# Patient Record
Sex: FEMALE | Race: WHITE | NOT HISPANIC OR LATINO | Employment: UNEMPLOYED | URBAN - METROPOLITAN AREA
[De-identification: names, ages, dates, MRNs, and addresses within clinical notes are randomized per-mention and may not be internally consistent; named-entity substitution may affect disease eponyms.]

---

## 2017-01-12 ENCOUNTER — GENERIC CONVERSION - ENCOUNTER (OUTPATIENT)
Dept: OTHER | Facility: OTHER | Age: 57
End: 2017-01-12

## 2017-02-21 ENCOUNTER — ALLSCRIPTS OFFICE VISIT (OUTPATIENT)
Dept: OTHER | Facility: OTHER | Age: 57
End: 2017-02-21

## 2017-02-21 LAB
BILIRUB UR QL STRIP: NEGATIVE
CLARITY UR: NORMAL
COLOR UR: YELLOW
GLUCOSE (HISTORICAL): NEGATIVE
HGB UR QL STRIP.AUTO: NEGATIVE
KETONES UR STRIP-MCNC: NEGATIVE MG/DL
LEUKOCYTE ESTERASE UR QL STRIP: NORMAL
NITRITE UR QL STRIP: NEGATIVE
PH UR STRIP.AUTO: 7 [PH]
PROT UR STRIP-MCNC: NORMAL MG/DL
SP GR UR STRIP.AUTO: 1
UROBILINOGEN UR QL STRIP.AUTO: NEGATIVE

## 2017-02-22 DIAGNOSIS — Z13.220 ENCOUNTER FOR SCREENING FOR LIPOID DISORDERS: ICD-10-CM

## 2017-02-22 DIAGNOSIS — Z12.31 ENCOUNTER FOR SCREENING MAMMOGRAM FOR MALIGNANT NEOPLASM OF BREAST: ICD-10-CM

## 2017-02-22 DIAGNOSIS — E55.9 VITAMIN D DEFICIENCY: ICD-10-CM

## 2017-02-22 DIAGNOSIS — Z23 ENCOUNTER FOR IMMUNIZATION: ICD-10-CM

## 2017-02-22 DIAGNOSIS — Z00.00 ENCOUNTER FOR GENERAL ADULT MEDICAL EXAMINATION WITHOUT ABNORMAL FINDINGS: ICD-10-CM

## 2017-02-22 DIAGNOSIS — R53.82 CHRONIC FATIGUE: ICD-10-CM

## 2017-03-07 ENCOUNTER — HOSPITAL ENCOUNTER (OUTPATIENT)
Dept: RADIOLOGY | Age: 57
Discharge: HOME | End: 2017-03-07
Payer: COMMERCIAL

## 2017-03-07 DIAGNOSIS — Z12.31 ENCOUNTER FOR SCREENING MAMMOGRAM FOR MALIGNANT NEOPLASM OF BREAST: ICD-10-CM

## 2017-03-07 PROCEDURE — G0202 SCR MAMMO BI INCL CAD: HCPCS

## 2017-03-21 ENCOUNTER — ALLSCRIPTS OFFICE VISIT (OUTPATIENT)
Dept: OTHER | Facility: OTHER | Age: 57
End: 2017-03-21

## 2017-03-30 ENCOUNTER — LAB CONVERSION - ENCOUNTER (OUTPATIENT)
Dept: OTHER | Facility: OTHER | Age: 57
End: 2017-03-30

## 2017-03-30 LAB
25(OH)D3 SERPL-MCNC: 41 NG/ML (ref 30–100)
A/G RATIO (HISTORICAL): 2.4 (CALC) (ref 1–2.5)
ALBUMIN SERPL BCP-MCNC: 4.5 G/DL (ref 3.6–5.1)
ALP SERPL-CCNC: 58 U/L (ref 33–130)
ALT SERPL W P-5'-P-CCNC: 23 U/L (ref 6–29)
AST SERPL W P-5'-P-CCNC: 22 U/L (ref 10–35)
BASOPHILS # BLD AUTO: 1.3 %
BASOPHILS # BLD AUTO: 56 CELLS/UL (ref 0–200)
BILIRUB SERPL-MCNC: 0.4 MG/DL (ref 0.2–1.2)
BUN SERPL-MCNC: 14 MG/DL (ref 7–25)
BUN/CREA RATIO (HISTORICAL): NORMAL (CALC) (ref 6–22)
CALCIUM SERPL-MCNC: 9.6 MG/DL (ref 8.6–10.4)
CHLORIDE SERPL-SCNC: 101 MMOL/L (ref 98–110)
CHOLEST SERPL-MCNC: 213 MG/DL (ref 125–200)
CHOLEST/HDLC SERPL: 2.9 (CALC)
CO2 SERPL-SCNC: 26 MMOL/L (ref 20–31)
CREAT SERPL-MCNC: 1.02 MG/DL (ref 0.5–1.05)
DEPRECATED RDW RBC AUTO: 14.6 % (ref 11–15)
EGFR AFRICAN AMERICAN (HISTORICAL): 71 ML/MIN/1.73M2
EGFR-AMERICAN CALC (HISTORICAL): 61 ML/MIN/1.73M2
EOSINOPHIL # BLD AUTO: 215 CELLS/UL (ref 15–500)
EOSINOPHIL # BLD AUTO: 5 %
ESTRADIOL LEVEL (HISTORICAL): 16 PG/ML
GAMMA GLOBULIN (HISTORICAL): 1.9 G/DL (CALC) (ref 1.9–3.7)
GLUCOSE (HISTORICAL): 95 MG/DL (ref 65–99)
HCT VFR BLD AUTO: 44.3 % (ref 35–45)
HDLC SERPL-MCNC: 73 MG/DL
HGB BLD-MCNC: 14.4 G/DL (ref 11.7–15.5)
LDL CHOLESTEROL (HISTORICAL): 126 MG/DL (CALC)
LYMPHOCYTES # BLD AUTO: 1458 CELLS/UL (ref 850–3900)
LYMPHOCYTES # BLD AUTO: 33.9 %
MCH RBC QN AUTO: 28.5 PG (ref 27–33)
MCHC RBC AUTO-ENTMCNC: 32.7 G/DL (ref 32–36)
MCV RBC AUTO: 87.4 FL (ref 80–100)
MONOCYTES # BLD AUTO: 305 CELLS/UL (ref 200–950)
MONOCYTES (HISTORICAL): 7.1 %
NEUTROPHILS # BLD AUTO: 2266 CELLS/UL (ref 1500–7800)
NEUTROPHILS # BLD AUTO: 52.7 %
NON-HDL-CHOL (CHOL-HDL) (HISTORICAL): 140 MG/DL (CALC)
PLATELET # BLD AUTO: 238 THOUSAND/UL (ref 140–400)
PMV BLD AUTO: 9.5 FL (ref 7.5–12.5)
POTASSIUM SERPL-SCNC: 4 MMOL/L (ref 3.5–5.3)
PROGESTERONE LEVEL (HISTORICAL): <0.5 NG/ML
RBC # BLD AUTO: 5.07 MILLION/UL (ref 3.8–5.1)
SODIUM SERPL-SCNC: 137 MMOL/L (ref 135–146)
T3FREE SERPL-MCNC: 2.9 PG/ML (ref 2.3–4.2)
T4 FREE SERPL-MCNC: 1.3 NG/DL (ref 0.8–1.8)
TOTAL PROTEIN (HISTORICAL): 6.4 G/DL (ref 6.1–8.1)
TRIGL SERPL-MCNC: 72 MG/DL
WBC # BLD AUTO: 4.3 THOUSAND/UL (ref 3.8–10.8)

## 2017-04-11 ENCOUNTER — GENERIC CONVERSION - ENCOUNTER (OUTPATIENT)
Dept: OTHER | Facility: OTHER | Age: 57
End: 2017-04-11

## 2017-11-01 DIAGNOSIS — Z11.59 ENCOUNTER FOR SCREENING FOR OTHER VIRAL DISEASES (CODE): ICD-10-CM

## 2017-11-08 ENCOUNTER — GENERIC CONVERSION - ENCOUNTER (OUTPATIENT)
Dept: OTHER | Facility: OTHER | Age: 57
End: 2017-11-08

## 2017-11-08 ENCOUNTER — LAB CONVERSION - ENCOUNTER (OUTPATIENT)
Dept: OTHER | Facility: OTHER | Age: 57
End: 2017-11-08

## 2017-11-08 LAB
HEPATITIS C ANTIBODY (HISTORICAL): NORMAL
SIGNAL TO CUT-OFF (HISTORICAL): 0

## 2018-01-10 NOTE — RESULT NOTES
Verified Results  (Q) HEPATITIS C ANTIBODY 62PSP8088 08:26AM Eleanor Villalba   REPORT COMMENT:  FASTING:NO     Test Name Result Flag Reference   HEPATITIS C ANTIBODY NON-REACTIVE  NON-REACTIVE   SIGNAL TO CUT-OFF 0 00  <1 00

## 2018-01-12 VITALS
DIASTOLIC BLOOD PRESSURE: 82 MMHG | SYSTOLIC BLOOD PRESSURE: 122 MMHG | OXYGEN SATURATION: 99 % | RESPIRATION RATE: 16 BRPM | WEIGHT: 170.2 LBS | BODY MASS INDEX: 29.06 KG/M2 | TEMPERATURE: 98.1 F | HEIGHT: 64 IN | HEART RATE: 50 BPM

## 2018-01-12 NOTE — MISCELLANEOUS
Message   Recorded as Task   Date: 01/12/2017 05:11 PM, Created By: Torie Lopez   Task Name: Follow Up   Assigned To: Betty Carroll   Regarding Patient: Snehal Veronica, Status: In Progress   Comment:    Torie Lopez - 12 Jan 2017 5:11 PM     TASK CREATED  Pt lm - yeast in - used monistat 1 - still has sx  [address] Jan 2017 5:12 PM     TASK IN PROGRESS   Torie Lopez - 12 Jan 2017 5:15 PM     TASK EDITED  Rx diflucan, 150 mg, 2 tabs, sig 1 po stat and 1 in 3 days to r/a [id number]        Active Problems    1  Encounter for screening for lipid disorder (V77 91) (Z13 220)   2  Hip pain, right (719 45) (M25 551)   3  Influenza vaccine needed (V04 81) (Z23)   4  Pain in both wrists (719 43) (M25 531,M25 532)   5  Vitamin D deficiency (268 9) (E55 9)    Current Meds   1  Calcium + D 250-125 MG-UNIT TABS; Therapy: 32HET2695 to (Last Rx:11May2011)  Requested for: 86NUE3137 Ordered   2  Cyclobenzaprine HCl - 10 MG Oral Tablet; 1/2-1 tab TID prn muscle spasm; Therapy: 53ZEW5523 to (Last Rx:69Teu9971)  Requested for: 91HDT6449 Ordered   3  Doxycycline Hyclate 20 MG Oral Tablet Recorded   4  Fish Oil OIL; Therapy: 71WEC7816 to (Last Rx:11May2011)  Requested for: 67GOO0127 Ordered   5  Meloxicam 7 5 MG Oral Tablet; TAKE 1 TABLET TWICE DAILY WITH FOOD; Therapy: 83JDC3204 to (Renew:54Yft3969)  Requested for: 23Jun2016; Last   KB:70TLE2109 Ordered   6  Multivitamins Oral Capsule; Therapy: 40MUA9342 to (Last Rx:63Spc5209)  Requested for: 39MEY4103 Ordered   7  PreviDent 5000 Sensitive 1 1-5 % Dental Paste; Therapy: 66EQW0515 to (Last Rx:17Mar2011)  Requested for: 57QGL1958 Ordered   8  Selenium TABS Recorded    Allergies    1  No Known Drug Allergies    Signatures   Electronically signed by :  Savanna Cisse, ; Jan 12 2017  5:16PM EST                       (Author)

## 2018-01-12 NOTE — RESULT NOTES
Verified Results  (1) CBC/PLT/DIFF 62NAY4587 06:58AM Natalie Velasco     Test Name Result Flag Reference   WHITE BLOOD CELL COUNT 4 3 Thousand/uL  3 8-10 8   RED BLOOD CELL COUNT 5 07 Million/uL  3 80-5 10   HEMOGLOBIN 14 4 g/dL  11 7-15 5   HEMATOCRIT 44 3 %  35 0-45 0   MCV 87 4 fL  80 0-100 0   MCH 28 5 pg  27 0-33 0   MCHC 32 7 g/dL  32 0-36 0   RDW 14 6 %  11 0-15 0   PLATELET COUNT 562 Thousand/uL  140-400   MPV 9 5 fL  7 5-12 5   ABSOLUTE NEUTROPHILS 2266 cells/uL  7633-8244   ABSOLUTE LYMPHOCYTES 1458 cells/uL  850-3900   ABSOLUTE MONOCYTES 305 cells/uL  200-950   ABSOLUTE EOSINOPHILS 215 cells/uL     ABSOLUTE BASOPHILS 56 cells/uL  0-200   NEUTROPHILS 52 7 %     LYMPHOCYTES 33 9 %     MONOCYTES 7 1 %     EOSINOPHILS 5 0 %     BASOPHILS 1 3 %       (1) COMPREHENSIVE METABOLIC PANEL 03WCU7855 10:59MM Natalie Velasco     Test Name Result Flag Reference   GLUCOSE 95 mg/dL  65-99   Fasting reference interval   UREA NITROGEN (BUN) 14 mg/dL  7-25   CREATININE 1 02 mg/dL  0 50-1 05   For patients >52years of age, the reference limit  for Creatinine is approximately 13% higher for people  identified as -American  eGFR NON-AFR   AMERICAN 61 mL/min/1 73m2  > OR = 60   eGFR AFRICAN AMERICAN 71 mL/min/1 73m2  > OR = 60   BUN/CREATININE RATIO   6-48   NOT APPLICABLE (calc)   SODIUM 137 mmol/L  135-146   POTASSIUM 4 0 mmol/L  3 5-5 3   CHLORIDE 101 mmol/L     CARBON DIOXIDE 26 mmol/L  20-31   CALCIUM 9 6 mg/dL  8 6-10 4   PROTEIN, TOTAL 6 4 g/dL  6 1-8 1   ALBUMIN 4 5 g/dL  3 6-5 1   GLOBULIN 1 9 g/dL (calc)  1 9-3 7   ALBUMIN/GLOBULIN RATIO 2 4 (calc)  1 0-2 5   BILIRUBIN, TOTAL 0 4 mg/dL  0 2-1 2   ALKALINE PHOSPHATASE 58 U/L     AST 22 U/L  10-35   ALT 23 U/L  6-29     (1) LIPID PANEL, FASTING 20NDF9394 06:58AM Natalie Velasco     Test Name Result Flag Reference   CHOLESTEROL, TOTAL 213 mg/dL H 125-200   HDL CHOLESTEROL 73 mg/dL  > OR = 46   TRIGLICERIDES 72 mg/dL  <206   LDL-CHOLESTEROL 126 mg/dL (calc)  <130   Desirable range <100 mg/dL for patients with CHD or  diabetes and <70 mg/dL for diabetic patients with  known heart disease  CHOL/HDLC RATIO 2 9 (calc)  < OR = 5 0   NON HDL CHOLESTEROL 140 mg/dL (calc)     Target for non-HDL cholesterol is 30 mg/dL higher than   LDL cholesterol target  (1) T4, FREE [id number] 06:58AM Krista, Vergil Amen     Test Name Result Flag Reference   T4, FREE 1 3 ng/dL  0 8-1 8     (1) FREE T3 40QFQ6517 06:58AM Krista, Vergil Amen   REPORT COMMENT:  FASTING:YES     Test Name Result Flag Reference   T3, FREE 2 9 pg/mL  2 3-4 2     (1) PROGESTERONE [id number] 06:58AM Krista, Vergil Amen     Test Name Result Flag Reference   PROGESTERONE <0 5 ng/mL     Reference Ranges           Female              Follicular Phase     < 1 0              Luteal Phase      2 6-21 5              Post menopausal      < 0 5              Pregnancy              1st Trimester     4 1-34 0              2nd Trimester    24 0-76 0              3rd Trimester   52 0-302 0     (Q) ESTRADIOL 37JYO7341 06:58AM Krista, Vergil Amen     Test Name Result Flag Reference   ESTRADIOL 16 pg/mL     Reference Range          Follicular Phase:              Mid-Cycle:                     Luteal Phase:                  Postmenopausal:      < or = 31            Reference range established on post-pubertal patient  population  No pre-pubertal reference range  established using this assay  For any patients for  whom low Estradiol levels are anticipated (e g  males,  pre-pubertal children and hypogonadal/post-menopausal   females), the [employer]  Estradiol, Ultrasensitive, LCMSMS assay is recommended  (order code 45050)  Please note: patients being treated with the drug   fulvestrant (Faslodex(R)) have demonstrated significant   interference in immunoassay methods for estradiol   measurement   The cross reactivity could lead to falsely   elevated estradiol test results leading to an   inappropriate clinical assessment of estrogen status  [employer] order code 30289-Estradiol,   Ultrasensitive LC/MS/MS demonstrates negligible cross   reactivity with fulvestrant  *(Q) VITAMIN D, 25-HYDROXY, LC/MS/MS 81JVK7811 06:58AM Liz Velasco     Test Name Result Flag Reference   VITAMIN D, 25-OH, TOTAL 41 ng/mL     Vitamin D Status         25-OH Vitamin D:     Deficiency:                    <20 ng/mL  Insufficiency:             20 - 29 ng/mL  Optimal:                 > or = 30 ng/mL     For 25-OH Vitamin D testing on patients on   D2-supplementation and patients for whom quantitation   of D2 and D3 fractions is required, the QuestAssureD(TM)  25-OH VIT D, (D2,D3), LC/MS/MS is recommended: order   code 56193 (patients >2yrs)  For more information on this test, go to:  http://education  [employer]/faq/DIO281  (This link is being provided for   informational/educational purposes only )

## 2018-01-13 NOTE — PROGRESS NOTES
Assessment    1  Encounter for preventive health examination (V70 0) (Z00 00)    Plan  Chronic fatigue    · (1) FREE T3; Status:Active; Requested for:77Tjw2081;    · (1) PROGESTERONE; Status:Active; Requested for:56Pbt1258;   Chronic fatigue, Health Maintenance, Encounter for screening for lipid disorder, Influenza  vaccine needed, Vitamin D deficiency    · (1) T4, FREE; Status:Active; Requested for:92Jiz5632;   Chronic fatigue, Vitamin D deficiency    · (1) VITAMIN D 25-HYDROXY; Status:Active; Requested for:18Zjr4161; Health Maintenance, Encounter for screening for lipid disorder, Influenza vaccine  needed, Vitamin D deficiency    · (1) CBC/PLT/DIFF; Status:Active; Requested for:23Umy4846;    · (1) COMPREHENSIVE METABOLIC PANEL; Status:Active; Requested for:92Ipk4159;    · (1) ESTRADIOL; Status:Active; Requested for:61Gdx1134;    · (1) LIPID PANEL, FASTING; Status:Active; Requested for:46Vxt5219;     Discussion/Summary  healthy adult female Currently, she eats a healthy diet  cervical cancer screening is current Breast cancer screening: the risks and benefits of breast cancer screening were discussed and mammogram is needed every year  Colorectal cancer screening: the risks and benefits of colorectal cancer screening were discussed  Osteoporosis screening: the risks and benefits of osteoporosis screening were discussed  The risks and benefits of immunizations were discussed  Advice and education were given regarding weight bearing exercise  Patient discussion: discussed with the patient  Patient in good health  Discussed need for labs and ordered  Discussed estrogen and it's relationship to good health and need for normalizing  Exam normal       Chief Complaint  physical      History of Present Illness  HM, Adult Female: The patient is being seen for a health maintenance evaluation  The last health maintenance visit was seven yrs ago year(s) ago  General Health:  The patient's health since the last visit is described as good  She has regular dental visits  She complains of vision problems  Reproductive health: the patient is postmenopausal    Screening: cancer screening reviewed and current  metabolic screening reviewed and current  risk screening reviewed and current  HPI: Patient for adult female physical exam  offers no new complaints      Review of Systems    Constitutional: No fever, no chills, feels well, no tiredness, no recent weight gain or weight loss  Eyes: No complaints of eye pain, no red eyes, no eyesight problems, no discharge, no dry eyes, no itching of eyes  ENT: no complaints of earache, no loss of hearing, no nose bleeds, no nasal discharge, no sore throat, no hoarseness  Cardiovascular: No complaints of slow heart rate, no fast heart rate, no chest pain, no palpitations, no leg claudication, no lower extremity edema  Respiratory: No complaints of shortness of breath, no wheezing, no cough, no SOB on exertion, no orthopnea, no PND  Gastrointestinal: No complaints of abdominal pain, no constipation, no nausea or vomiting, no diarrhea, no bloody stools  Genitourinary: No complaints of dysuria, no incontinence, no pelvic pain, no dysmenorrhea, no vaginal discharge or bleeding  Musculoskeletal: No complaints of arthralgias, no myalgias, no joint swelling or stiffness, no limb pain or swelling  Integumentary: No complaints of skin rash or lesions, no itching, no skin wounds, no breast pain or lump  Neurological: No complaints of headache, no confusion, no convulsions, no numbness, no dizziness or fainting, no tingling, no limb weakness, no difficulty walking  Psychiatric: Not suicidal, no sleep disturbance, no anxiety or depression, no change in personality, no emotional problems  Endocrine: No complaints of proptosis, no hot flashes, no muscle weakness, no deepening of the voice, no feelings of weakness     Hematologic/Lymphatic: No complaints of swollen glands, no swollen glands in the neck, does not bleed easily, does not bruise easily  Active Problems    1  Encounter for screening for lipid disorder (V77 91) (Z13 220)   2  Influenza vaccine needed (V04 81) (Z23)   3   Vitamin D deficiency (268 9) (E55 9)    Past Medical History    · History of Costochondritis (733 6) (M94 0)   · History of Forceps delivery (669 50) (O66 5)   · History of Gum And Periodontal Disease (523 9)   · History of acute bronchitis (V12 69) (Z87 09)   · History of alopecia (V13 89) (X71 326)   · History of hypoglycemia (V12 29) (Z86 39)   · History of lymphadenopathy (V13 89) (M80 956)   · History of morphea (V13 3) (Z87 2)   · History of shortness of breath (V13 89) (M31 447)   · History of shortness of breath (V13 89) (C47 956)   · History of thyroid disease (V12 29) (Z86 39)   · History of Menopause Occurred At Age 39   · History of Primary female infertility (628 9) (N97 9)   · History of Vacuum extractor delivery, delivered (062 26) (O66 5)    Surgical History    · History of Complete Colonoscopy    Family History  Mother    · Family history of Asthma (V17 5)  Father    · Family history of Heart Disease (V17 49)   · Family history of Hypertension (V17 49)   · Family history of Lymphoma (V16 7)   · Family history of Prolapsing Mitral Valve Leaflet Syndrome   · Family history of Systemic Lupus Erythematosus  Son    · Family history of Diabetes Mellitus (V18 0)  Sister    · Family history of Prolapsing Mitral Valve Leaflet Syndrome  Paternal Grandmother    · Family history of Stroke Syndrome (V17 1)  Maternal Grandfather    · Family history of Breast Cancer (V16 3)   · Family history of Prostate Cancer (V16 42)  Maternal Uncle    · Family history of Glaucoma    Social History    · Being A Social Drinker   · Daily Coffee Consumption (2  Cups/Day)   · Denied: History of Drug Use   · Exercising Regularly   · Exercising regularly (more than 90 min/week)   · Marital History - Currently    · Never A Smoker   · Denied: History of Tobacco Use   · Uses Safety Equipment - Protective Head Gear   · Uses Safety Equipment - Seatbelts    Current Meds   1  Calcium + D 250-125 MG-UNIT TABS; Therapy: 71HME5416 to (Last Rx:11May2011)  Requested for: 57TJQ9697 Ordered   2  Cyclobenzaprine HCl - 10 MG Oral Tablet; 1/2-1 tab TID prn muscle spasm; Therapy: 03OJY3922 to (Last Rx:19May2016)  Requested for: 03IEC9551 Ordered   3  Doxycycline Hyclate 20 MG Oral Tablet Recorded   4  Fish Oil OIL; Therapy: 55UTP0895 to (Last Rx:11May2011)  Requested for: 88THY3656 Ordered   5  Meloxicam 7 5 MG Oral Tablet; TAKE 1 TABLET TWICE DAILY WITH FOOD; Therapy: 52WGB4748 to (Renew:49Ihw0845)  Requested for: 23Jun2016; Last   FE:56QUG8445 Ordered   6  Multivitamins Oral Capsule; Therapy: 16BHS5311 to (Last Rx:11May2011)  Requested for: 91MKB4743 Ordered   7  PreviDent 5000 Sensitive 1 1-5 % Dental Paste; Therapy: 31GJD1315 to (Last Rx:17Mar2011)  Requested for: 41SCB1119 Ordered   8  Selenium TABS Recorded    Allergies    1  No Known Drug Allergies    Vitals   Recorded: 74Wjw8035 09:11AM   Temperature 98 1 F, Tympanic   Heart Rate 50, L Brachial Artery   Pulse Quality Normal, L Brachial Artery   Respiration Quality Normal   Respiration 16   Systolic 955, LUE, Sitting   Diastolic 82, LUE, Sitting   Height 5 ft 3 5 in   Weight 170 lb 3 2 oz   BMI Calculated 29 68   BSA Calculated 1 82   O2 Saturation 99     Physical Exam    Constitutional   General appearance: No acute distress, well appearing and well nourished  Eyes   Conjunctiva and lids: No swelling, erythema or discharge  Pupils and irises: Equal, round and reactive to light  Ears, Nose, Mouth, and Throat   External inspection of ears and nose: Normal     Otoscopic examination: Tympanic membranes translucent with normal light reflex  Canals patent without erythema  Oropharynx: Normal with no erythema, edema, exudate or lesions      Pulmonary   Respiratory effort: No increased work of breathing or signs of respiratory distress  Auscultation of lungs: Clear to auscultation  Cardiovascular   Palpation of heart: Normal PMI, no thrills  Auscultation of heart: Normal rate and rhythm, normal S1 and S2, without murmurs  Examination of extremities for edema and/or varicosities: Normal     Abdomen   Abdomen: Non-tender, no masses  Liver and spleen: No hepatomegaly or splenomegaly  Lymphatic   Palpation of lymph nodes in neck: No lymphadenopathy  Musculoskeletal   Gait and station: Normal     Digits and nails: Normal without clubbing or cyanosis  Inspection/palpation of joints, bones, and muscles: Normal     Skin   Skin and subcutaneous tissue: Normal without rashes or lesions  Neurologic   Cranial nerves: Cranial nerves 2-12 intact  Reflexes: 2+ and symmetric  Sensation: No sensory loss  Psychiatric   Orientation to person, place, and time: Normal     Mood and affect: Normal        Results/Data  Urine Dip Non-Automated- POC [id number] 09:15AM Sterling Velasco     Test Name Result Flag Reference   Color Yellow     Clarity Transparent     Leukocytes trace     Nitrite negative     Blood negative     Bilirubin negative     Urobilinogen negative     Protein trace     Ph 7     Specific Gravity 1 000     Ketone negative     Glucose negative       PHQ-2 Adult Depression Screening 34Ydm0426 09:11AM User, [employer]     Test Name Result Flag Reference   PHQ-2 Adult Depression Score 0     Over the last two weeks, how often have you been bothered by any of the following problems? Little interest or pleasure in doing things: Not at all - 0  Feeling down, depressed, or hopeless: Not at all - 0   PHQ-2 Adult Depression Screening Negative         Procedure    Procedure: Visual Acuity Test    Indication: routine screening     Results: 20/20 in both eyes with corrective device, 20/20 in the right eye with corrective device, 20/20 in the left eye with corrective device normal in both eyes  Color vision was and the results were normal    The patient tolerated the procedure well  There were no complications  Procedure: Hearing Acuity Test    Indication: Routine screeing  Audiometry:   Hearing in the right ear: 20 decibals at 500 hertz, 40 decibals at 1000 hertz, 25 decibals at 2000 hertz and 40 decibals at 4000 hertz  Hearing in the left ear: 20 decibals at 500 hertz, 40 decibals at 1000 hertz, 40 decibals at 2000 hertz and 20 decibals at 4000 hertz  The patient Tolerated the procedure well  Future Appointments    Date/Time Provider Specialty Site   03/14/2017 11:00 AM MARIA E Ballard  Obstetrics/Gynecology [facility]     Signatures   Electronically signed by :  MARIA E Springer ; Feb 21 2017 10:03AM EST                       (Author)

## 2018-01-13 NOTE — RESULT NOTES
Verified Results  (1) CBC/PLT/DIFF [id number] 08:33AM Linford Wilmot     Test Name Result Flag Reference   WHITE BLOOD CELL COUNT 4 9 Thousand/uL  3 8-10 8   RED BLOOD CELL COUNT 4 88 Million/uL  3 80-5 10   HEMOGLOBIN 13 8 g/dL  11 7-15 5   HEMATOCRIT 43 0 %  35 0-45 0   MCV 88 1 fL  80 0-100 0   MCH 28 3 pg  27 0-33 0   MCHC 32 1 g/dL  32 0-36 0   RDW 14 3 %  11 0-15 0   PLATELET COUNT 258 Thousand/uL  140-400   MPV 9 3 fL  7 5-11 5   ABSOLUTE NEUTROPHILS 2749 cells/uL  5095-3184   ABSOLUTE LYMPHOCYTES 1612 cells/uL  850-3900   ABSOLUTE MONOCYTES 338 cells/uL  200-950   ABSOLUTE EOSINOPHILS 167 cells/uL     ABSOLUTE BASOPHILS 34 cells/uL  0-200   NEUTROPHILS 56 1 %     LYMPHOCYTES 32 9 %     MONOCYTES 6 9 %     EOSINOPHILS 3 4 %     BASOPHILS 0 7 %       (1) COMPREHENSIVE METABOLIC PANEL 54YOH9305 94:04QO Didimamoff, Omega Oms     Test Name Result Flag Reference   GLUCOSE 91 mg/dL  65-99   Fasting reference interval   UREA NITROGEN (BUN) 18 mg/dL  7-25   CREATININE 0 96 mg/dL  0 50-1 05   For patients >52years of age, the reference limit  for Creatinine is approximately 13% higher for people  identified as -American  eGFR NON-AFR   AMERICAN 67 mL/min/1 73m2  > OR = 60   eGFR AFRICAN AMERICAN 77 mL/min/1 73m2  > OR = 60   BUN/CREATININE RATIO   8-16   NOT APPLICABLE (calc)   SODIUM 139 mmol/L  135-146   POTASSIUM 4 0 mmol/L  3 5-5 3   CHLORIDE 104 mmol/L     CARBON DIOXIDE 24 mmol/L  19-30   CALCIUM 9 6 mg/dL  8 6-10 4   PROTEIN, TOTAL 6 2 g/dL  6 1-8 1   ALBUMIN 4 4 g/dL  3 6-5 1   GLOBULIN 1 8 g/dL (calc) L 1 9-3 7   ALBUMIN/GLOBULIN RATIO 2 4 (calc)  1 0-2 5   BILIRUBIN, TOTAL 0 8 mg/dL  0 2-1 2   ALKALINE PHOSPHATASE 51 U/L     AST 22 U/L  10-35   ALT 21 U/L  6-29     (Q) LIPID PANEL WITH REFLEX TO DIRECT LDL [id number] 08:33AM Didimamoff, Warden Oms     Test Name Result Flag Reference   CHOLESTEROL, TOTAL 220 mg/dL H 125-200   HDL CHOLESTEROL 89 mg/dL  > OR = 46   TRIGLICERIDES 56 mg/dL <150   LDL-CHOLESTEROL 120 mg/dL (calc)  <130   Desirable range <100 mg/dL for patients with CHD or  diabetes and <70 mg/dL for diabetic patients with  known heart disease  CHOL/HDLC RATIO 2 5 (calc)  < OR = 5 0   NON HDL CHOLESTEROL 131 mg/dL (calc)     Target for non-HDL cholesterol is 30 mg/dL higher than   LDL cholesterol target  (Q) SED RATE BY MODIFIED Siddharth Hamilton [id number] 08:33AM Didimaroberto Yesenia Gastonpriscila     Test Name Result Flag Reference   SED RATE BY MODIFIED$[place] 2 mm/h  < OR = 30     (Q) LYME DISEASE AB, TOTAL W/REFL WB (IGG, IGM) [id number] 08:33AM Didnabilroberto Yesenia Jacqui     Test Name Result Flag Reference   LYME AB SCREEN < OR = 0 90 index     Index                 Interpretation                     -----                 --------------                     < or = 0 90           Negative                     0  91-1 09             Equivocal                     > or = 1 10           Positive     The use of purified VlsE-1 and PepC10 antigens in this  assay provides improved specificity compared to assays  that utilize whole cell lysates of B  burgdorferi, the  causative agent of Lyme disease, and slightly better  sensitivity compared to the C6 antibody assay  As recommended by the Food and Drug   Administration (FDA), all samples with positive or   equivocal results in a Borrelia burgdorferi antibody    EIA (screening) will be tested using a blot method  Positive or equivocal screening test results should not   be interpreted as truly positive until verified as such   using a supplemental assay (e g , B  burgdorferi blot)  The screening test and/or blot for B  burgdorferi   antibodies may be falsely negative in early stages of   Lyme disease, including the period when erythema   migrans is apparent       (Q) ANTWAN IFA SCREEN W/REFL TO TITER AND PATTERN, IFA [id number] 08:33AM Aurelia Pop     Test Name Result Flag Reference   ANTWAN SCREEN, IFA NEGATIVE  NEGATIVE     (1) RF QUANTITATION [id number] 08: 33AM [facility]     Test Name Result Flag Reference   RHEUMATOID FACTOR 10 IU/mL  <14     (Q) TSH, 3RD GENERATION [id number] 08:33AM [facility]     Test Name Result Flag Reference   TSH 3 34 mIU/L     Reference Range                         > or = 20 Years  0 40-4 50                              Pregnancy Ranges            First trimester    0 26-2 66            Second trimester   0 55-2 73            Third trimester    0 43-2 91     *(Q) VITAMIN D, 25-HYDROXY, LC/MS/MS [id number] 08:33AM Carlotta Villalba   REPORT COMMENT:  FASTING:YES     Test Name Result Flag Reference   VITAMIN D, 25-OH, TOTAL 43 ng/mL     Vitamin D Status         25-OH Vitamin D:     Deficiency:                    <20 ng/mL  Insufficiency:             20 - 29 ng/mL  Optimal:                 > or = 30 ng/mL     For 25-OH Vitamin D testing on patients on   D2-supplementation and patients for whom quantitation   of D2 and D3 fractions is required, the QuestAssureD(TM)  25-OH VIT D, (D2,D3), LC/MS/MS is recommended: order   code 42815 (patients >2yrs)  For more information on this test, go to:  http://education  [employer]/faq/TOU739  (This link is being provided for   informational/educational purposes only )

## 2018-01-14 VITALS
BODY MASS INDEX: 28.51 KG/M2 | HEIGHT: 64 IN | WEIGHT: 167 LBS | SYSTOLIC BLOOD PRESSURE: 100 MMHG | DIASTOLIC BLOOD PRESSURE: 72 MMHG

## 2018-02-08 ENCOUNTER — TELEPHONE (OUTPATIENT)
Dept: OBGYN CLINIC | Facility: MEDICAL CENTER | Age: 58
End: 2018-02-08

## 2018-02-08 DIAGNOSIS — Z12.31 ENCOUNTER FOR SCREENING MAMMOGRAM FOR MALIGNANT NEOPLASM OF BREAST: Primary | ICD-10-CM

## 2018-03-15 ENCOUNTER — TRANSCRIBE ORDERS (OUTPATIENT)
Dept: RADIOLOGY | Facility: CLINIC | Age: 58
End: 2018-03-15

## 2018-03-15 PROBLEM — R53.82 CHRONIC FATIGUE: Status: ACTIVE | Noted: 2017-02-21

## 2018-03-15 PROBLEM — D18.09 HEMANGIOMA, GENITAL: Status: ACTIVE | Noted: 2017-03-21

## 2018-03-19 ENCOUNTER — HOSPITAL ENCOUNTER (OUTPATIENT)
Dept: RADIOLOGY | Age: 58
Discharge: HOME | End: 2018-03-19
Payer: COMMERCIAL

## 2018-03-19 DIAGNOSIS — Z12.31 ENCOUNTER FOR SCREENING MAMMOGRAM FOR MALIGNANT NEOPLASM OF BREAST: ICD-10-CM

## 2018-03-19 PROCEDURE — 77067 SCR MAMMO BI INCL CAD: CPT

## 2018-03-20 ENCOUNTER — OFFICE VISIT (OUTPATIENT)
Dept: OBGYN CLINIC | Facility: MEDICAL CENTER | Age: 58
End: 2018-03-20
Payer: COMMERCIAL

## 2018-03-20 VITALS
DIASTOLIC BLOOD PRESSURE: 62 MMHG | WEIGHT: 165.8 LBS | SYSTOLIC BLOOD PRESSURE: 102 MMHG | HEIGHT: 64 IN | BODY MASS INDEX: 28.31 KG/M2

## 2018-03-20 DIAGNOSIS — Z01.419 ENCOUNTER FOR GYNECOLOGICAL EXAMINATION (GENERAL) (ROUTINE) WITHOUT ABNORMAL FINDINGS: Primary | ICD-10-CM

## 2018-03-20 DIAGNOSIS — Z11.51 SCREENING FOR HUMAN PAPILLOMAVIRUS (HPV): ICD-10-CM

## 2018-03-20 DIAGNOSIS — Z12.31 ENCOUNTER FOR SCREENING MAMMOGRAM FOR MALIGNANT NEOPLASM OF BREAST: ICD-10-CM

## 2018-03-20 PROCEDURE — 87624 HPV HI-RISK TYP POOLED RSLT: CPT | Performed by: OBSTETRICS & GYNECOLOGY

## 2018-03-20 PROCEDURE — 99396 PREV VISIT EST AGE 40-64: CPT | Performed by: OBSTETRICS & GYNECOLOGY

## 2018-03-20 PROCEDURE — G0145 SCR C/V CYTO,THINLAYER,RESCR: HCPCS | Performed by: OBSTETRICS & GYNECOLOGY

## 2018-03-20 RX ORDER — SELENIUM 100 MCG
TABLET ORAL
COMMUNITY
End: 2019-02-26 | Stop reason: ALTCHOICE

## 2018-03-20 RX ORDER — DOXYCYCLINE HYCLATE 20 MG
TABLET ORAL
COMMUNITY

## 2018-03-20 NOTE — PROGRESS NOTES
Fiorella Perera is a 62 y o  female who is here for a routine gyn exam,     has been having some breast tenderness   had a normal mammogram yesterday   has no menopausal symptoms       Patient Active Problem List   Diagnosis    Chronic fatigue    Hemangioma, genital    Vitamin D deficiency         Social History     Social History    Marital status: /Civil Union     Spouse name: N/A    Number of children: N/A    Years of education: N/A     Occupational History    Not on file       Social History Main Topics    Smoking status: Never Smoker    Smokeless tobacco: Never Used    Alcohol use Yes      Comment: social    Drug use: No    Sexual activity: Yes     Partners: Male     Other Topics Concern    Not on file     Social History Narrative    2 cups of coffee daily    exercising regularly - more than 90 min/week    Uses safety equipment - headgear / seatbelts              Family History   Problem Relation Age of Onset    Asthma Mother     Heart disease Father     Hypertension Father     Lymphoma Father     Mitral valve prolapse Father     Lupus Father     Mitral valve prolapse Sister     Breast cancer Maternal Grandfather     Prostate cancer Maternal Grandfather     Stroke Paternal Grandmother     Diabetes Son     Glaucoma Maternal Uncle      Past Medical History:   Diagnosis Date    Alopecia     Bronchitis     Costochondritis     Forceps delivery     1993 son    Gum disease     Hypoglycemia     Hypoglycemia     last assessed 5/30/12; resolved 5/19/16    Lymphadenopathy     Morphea     Primary female infertility     Thyroid disease     Vacuum extractor delivery, delivered     56 son       Current Outpatient Prescriptions:     Calcium Carb-Ergocalciferol 250-125 MG-UNIT TABS, Take by mouth, Disp: , Rfl:     doxycycline (PERIOSTAT) 20 MG tablet, Take by mouth, Disp: , Rfl:     FISH OIL-CANOLA OIL-VIT D3 PO, by Does not apply route, Disp: , Rfl:     MULTIPLE VITAMINS ESSENTIAL PO, Take by mouth, Disp: , Rfl:     Selenium 100 MCG TABS, Take by mouth, Disp: , Rfl:     Sod Fluoride-Potassium Nitrate (PREVIDENT 5000 SENSITIVE) 1 1-5 % PSTE, Apply to teeth, Disp: , Rfl:     Review of Systems   Constitutional: Negative for fatigue  Respiratory: Negative for shortness of breath  Cardiovascular: Negative for chest pain and palpitations  Gastrointestinal: Negative for abdominal distention, abdominal pain and constipation  Genitourinary: Negative for dyspareunia, frequency, hematuria and pelvic pain  Bladder control: stress incontinence no                             urgency   no    2 Para       denies complaints with intercourse  Gynecologic History  No LMP recorded  Last Pap:   Results were: normal  Last mammogram: 2018   Results were: normal        The following portions of the patient's history were reviewed and updated as appropriate: allergies, current medications, past family history, past medical history, past social history, past surgical history and problem list     Review of Systems  Review of Systems     Objective     /62   Ht 5' 4" (1 626 m)   Wt 75 2 kg (165 lb 12 8 oz)   BMI 28 46 kg/m²     General Appearance:    Alert, cooperative, no distress, appears stated age                               Lungs:     Clear to auscultation bilaterally, respirations unlabored        Heart:    Regular rate and rhythm, S1 and S2 normal, no murmur, rub   or gallop   Breast Exam:  normal nipple, no mass   Abdomen:     Soft, non-tender, bowel sounds active all four quadrants,     no masses, no organomegaly     Genitalia      :Vulva: normal, no lesions  Vagina: normal mucosa  Cervix: normal appearance and thin prep PAP obtained  Uterus: normal size  Adnexa: normal adnexa and no mass, fullness, tenderness     Rectal:   normal   Extremities:   Extremities normal, atraumatic, no cyanosis or edema       Skin:   Skin color, texture, turgor normal, no rashes or lesions   Lymph nodes:   Cervical, supraclavicular, and axillary nodes normal             Assessment:  Encounter Diagnoses   Name Primary?  Encounter for screening mammogram for malignant neoplasm of breast     Encounter for gynecological examination (general) (routine) without abnormal findings Yes           Normal gynecological evalation and well visit         Plan     Education reviewed: none

## 2018-03-20 NOTE — PROGRESS NOTES
Patient presents today for a yearly GYN exam  Occasionally has breast sensitivity and pain  Is unsure which breast    Does not report any changes or any pain with the vulvar hemangioma found last year       Pap: 4/21/2015 neg/ HPV neg  Mammo: 3/19/2018  Colon: 2011

## 2018-03-22 LAB — HPV RRNA GENITAL QL NAA+PROBE: NORMAL

## 2018-03-23 LAB
LAB AP GYN PRIMARY INTERPRETATION: NORMAL
Lab: NORMAL

## 2019-02-16 ENCOUNTER — OFFICE VISIT (OUTPATIENT)
Dept: URGENT CARE | Facility: MEDICAL CENTER | Age: 59
End: 2019-02-16
Payer: COMMERCIAL

## 2019-02-16 VITALS
WEIGHT: 173.2 LBS | OXYGEN SATURATION: 98 % | HEIGHT: 65 IN | RESPIRATION RATE: 18 BRPM | SYSTOLIC BLOOD PRESSURE: 120 MMHG | HEART RATE: 76 BPM | BODY MASS INDEX: 28.86 KG/M2 | DIASTOLIC BLOOD PRESSURE: 74 MMHG | TEMPERATURE: 98.6 F

## 2019-02-16 DIAGNOSIS — J06.9 UPPER RESPIRATORY TRACT INFECTION, UNSPECIFIED TYPE: Primary | ICD-10-CM

## 2019-02-16 LAB — S PYO AG THROAT QL: NEGATIVE

## 2019-02-16 PROCEDURE — G0382 LEV 3 HOSP TYPE B ED VISIT: HCPCS | Performed by: PHYSICIAN ASSISTANT

## 2019-02-16 RX ORDER — BROMPHENIRAMINE MALEATE, PSEUDOEPHEDRINE HYDROCHLORIDE, AND DEXTROMETHORPHAN HYDROBROMIDE 2; 30; 10 MG/5ML; MG/5ML; MG/5ML
5 SYRUP ORAL 4 TIMES DAILY PRN
Qty: 120 ML | Refills: 0 | Status: SHIPPED | OUTPATIENT
Start: 2019-02-16 | End: 2019-02-21

## 2019-02-16 NOTE — PATIENT INSTRUCTIONS
Upper respiratory infection  bromfed as directed  Follow up with PCP in 3-5 days  Proceed to  ER if symptoms worsen  Pharyngitis   WHAT YOU NEED TO KNOW:   Pharyngitis, or sore throat, is inflammation of the tissues and structures in your pharynx (throat)  Pharyngitis is most often caused by bacteria  It may also be caused by a cold or flu virus  Other causes include smoking, allergies, or acid reflux  DISCHARGE INSTRUCTIONS:   Call 911 for any of the following:   · You have trouble breathing or swallowing because your throat is swollen or sore  Return to the emergency department if:   · You are drooling because it hurts too much to swallow  · Your fever is higher than 102? F (39?C) or lasts longer than 3 days  · You are confused  · You taste blood in your throat  Contact your healthcare provider if:   · Your throat pain gets worse  · You have a painful lump in your throat that does not go away after 5 days  · Your symptoms do not improve after 5 days  · You have questions or concerns about your condition or care  Medicines:  Viral pharyngitis will go away on its own without treatment  Your sore throat should start to feel better in 3 to 5 days for both viral and bacterial infections  You may need any of the following:  · Antibiotics  treat a bacterial infection  · NSAIDs , such as ibuprofen, help decrease swelling, pain, and fever  NSAIDs can cause stomach bleeding or kidney problems in certain people  If you take blood thinner medicine, always ask your healthcare provider if NSAIDs are safe for you  Always read the medicine label and follow directions  · Acetaminophen  decreases pain and fever  It is available without a doctor's order  Ask how much to take and how often to take it  Follow directions  Acetaminophen can cause liver damage if not taken correctly  · Take your medicine as directed    Contact your healthcare provider if you think your medicine is not helping or if you have side effects  Tell him or her if you are allergic to any medicine  Keep a list of the medicines, vitamins, and herbs you take  Include the amounts, and when and why you take them  Bring the list or the pill bottles to follow-up visits  Carry your medicine list with you in case of an emergency  Manage your symptoms:   · Gargle salt water  Mix ¼ teaspoon salt in an 8 ounce glass of warm water and gargle  This may help decrease swelling in your throat  · Drink liquids as directed  You may need to drink more liquids than usual  Liquids may help soothe your throat and prevent dehydration  Ask how much liquid to drink each day and which liquids are best for you  · Use a cool-steam humidifier  to help moisten the air in your room and calm your cough  · Soothe your throat  with cough drops, ice, soft foods, or popsicles  Prevent the spread of pharyngitis:  Cover your mouth and nose when you cough or sneeze  Do not share food or drinks  Wash your hands often  Use soap and water  If soap and water are unavailable, use an alcohol based hand   Follow up with your healthcare provider as directed:  Write down your questions so you remember to ask them during your visits  © 2017 [address] Information is for End User's use only and may not be sold, redistributed or otherwise used for commercial purposes  All illustrations and images included in CareNotes® are the copyrighted property of [employer]  or [employer]  The above information is an  only  It is not intended as medical advice for individual conditions or treatments  Talk to your doctor, nurse or pharmacist before following any medical regimen to see if it is safe and effective for you

## 2019-02-19 ENCOUNTER — OFFICE VISIT (OUTPATIENT)
Dept: FAMILY MEDICINE CLINIC | Facility: CLINIC | Age: 59
End: 2019-02-19
Payer: COMMERCIAL

## 2019-02-19 VITALS
HEIGHT: 65 IN | HEART RATE: 64 BPM | TEMPERATURE: 98.4 F | WEIGHT: 175 LBS | DIASTOLIC BLOOD PRESSURE: 60 MMHG | SYSTOLIC BLOOD PRESSURE: 98 MMHG | OXYGEN SATURATION: 98 % | RESPIRATION RATE: 16 BRPM | BODY MASS INDEX: 29.16 KG/M2

## 2019-02-19 DIAGNOSIS — J40 BRONCHITIS: Primary | ICD-10-CM

## 2019-02-19 PROCEDURE — 99213 OFFICE O/P EST LOW 20 MIN: CPT | Performed by: INTERNAL MEDICINE

## 2019-02-19 PROCEDURE — 3008F BODY MASS INDEX DOCD: CPT | Performed by: INTERNAL MEDICINE

## 2019-02-19 RX ORDER — CLARITHROMYCIN 500 MG/1
500 TABLET, COATED ORAL EVERY 12 HOURS SCHEDULED
Qty: 20 TABLET | Refills: 0 | Status: SHIPPED | OUTPATIENT
Start: 2019-02-19 | End: 2019-03-01

## 2019-02-19 NOTE — PROGRESS NOTES
Assessment/Plan:         Diagnoses and all orders for this visit:    Bronchitis  -     clarithromycin (BIAXIN) 500 mg tablet; Take 1 tablet (500 mg total) by mouth every 12 (twelve) hours for 10 days    Other orders  -     Dextromethorphan-Guaifenesin (MUCINEX DM PO); Take by mouth          Subjective:      Patient ID: Barb Girard is a 62 y o  female  Patient states she has been ill for 1 week  Positive headache  She developed a temperature of a 100 5° tried Tylenol and also tried Advil p m  Nia Nanny Denies sore throat  Positive 2 days of hoarseness  Negative strep test on no weekend   +cough denies ear pain or head congestion  Positive postnasal drip  The following portions of the patient's history were reviewed and updated as appropriate: She  has a past medical history of Alopecia, Bronchitis, Costochondritis, Forceps delivery, Gum disease, Hypoglycemia, Hypoglycemia, Lymphadenopathy, Morphea, Primary female infertility, Thyroid disease, and Vacuum extractor delivery, delivered  She   Patient Active Problem List    Diagnosis Date Noted    Hemangioma, genital 03/21/2017    Chronic fatigue 02/21/2017    Vitamin D deficiency 05/30/2012     She  has a past surgical history that includes Colonoscopy  Her family history includes Asthma in her mother; Breast cancer in her maternal grandfather; Diabetes in her son; Glaucoma in her maternal uncle; Heart disease in her father; Hypertension in her father; Lupus in her father; Lymphoma in her father; Mitral valve prolapse in her father and sister; Prostate cancer in her maternal grandfather; Stroke in her paternal grandmother  She  reports that she has never smoked  She has never used smokeless tobacco  She reports that she drinks alcohol  She reports that she does not use drugs    Current Outpatient Medications   Medication Sig Dispense Refill    brompheniramine-pseudoephedrine-DM 30-2-10 MG/5ML syrup Take 5 mL by mouth 4 (four) times a day as needed for cough for up to 5 days 120 mL 0    Calcium Carb-Ergocalciferol 250-125 MG-UNIT TABS Take by mouth      Dextromethorphan-Guaifenesin (MUCINEX DM PO) Take by mouth      doxycycline (PERIOSTAT) 20 MG tablet Take by mouth      FISH OIL-CANOLA OIL-VIT D3 PO by Does not apply route      MULTIPLE VITAMINS ESSENTIAL PO Take by mouth      Sod Fluoride-Potassium Nitrate (PREVIDENT 5000 SENSITIVE) 1 1-5 % PSTE Apply to teeth      clarithromycin (BIAXIN) 500 mg tablet Take 1 tablet (500 mg total) by mouth every 12 (twelve) hours for 10 days 20 tablet 0    Selenium 100 MCG TABS Take by mouth       No current facility-administered medications for this visit  Current Outpatient Medications on File Prior to Visit   Medication Sig    brompheniramine-pseudoephedrine-DM 30-2-10 MG/5ML syrup Take 5 mL by mouth 4 (four) times a day as needed for cough for up to 5 days    Calcium Carb-Ergocalciferol 250-125 MG-UNIT TABS Take by mouth    Dextromethorphan-Guaifenesin (MUCINEX DM PO) Take by mouth    doxycycline (PERIOSTAT) 20 MG tablet Take by mouth    FISH OIL-CANOLA OIL-VIT D3 PO by Does not apply route    MULTIPLE VITAMINS ESSENTIAL PO Take by mouth    Sod Fluoride-Potassium Nitrate (PREVIDENT 5000 SENSITIVE) 1 1-5 % PSTE Apply to teeth    Selenium 100 MCG TABS Take by mouth     No current facility-administered medications on file prior to visit  She is allergic to tetracycline       Review of Systems   Constitutional: Negative  HENT: Negative  Respiratory: Positive for cough  Cardiovascular: Negative  Objective:      BP 98/60 (BP Location: Left arm, Patient Position: Sitting, Cuff Size: Large)   Pulse 64   Temp 98 4 °F (36 9 °C) (Tympanic)   Resp 16   Ht 5' 5" (1 651 m)   Wt 79 4 kg (175 lb)   SpO2 98%   BMI 29 12 kg/m²          Physical Exam   Constitutional: She appears well-developed and well-nourished  No distress  HENT:   Head: Normocephalic     Right Ear: External ear normal    Left Ear: External ear normal    Nose: Nose normal    Mouth/Throat: Oropharynx is clear and moist    Neck: Normal range of motion  Neck supple  Cardiovascular: Normal rate, regular rhythm, normal heart sounds and intact distal pulses  Exam reveals no friction rub  No murmur heard  Pulmonary/Chest: Effort normal and breath sounds normal  No stridor  No respiratory distress  She has no wheezes  She has no rales  Lymphadenopathy:     She has no cervical adenopathy  Skin: She is not diaphoretic

## 2019-02-26 ENCOUNTER — OFFICE VISIT (OUTPATIENT)
Dept: GYNECOLOGY | Facility: CLINIC | Age: 59
End: 2019-02-26
Payer: COMMERCIAL

## 2019-02-26 VITALS
BODY MASS INDEX: 28.39 KG/M2 | SYSTOLIC BLOOD PRESSURE: 102 MMHG | HEART RATE: 60 BPM | DIASTOLIC BLOOD PRESSURE: 62 MMHG | HEIGHT: 65 IN | WEIGHT: 170.4 LBS

## 2019-02-26 DIAGNOSIS — Z01.411 ENCNTR FOR GYN EXAM (GENERAL) (ROUTINE) W ABNORMAL FINDINGS: Primary | ICD-10-CM

## 2019-02-26 DIAGNOSIS — N95.2 VAGINAL ATROPHY: ICD-10-CM

## 2019-02-26 DIAGNOSIS — N81.10 PROLAPSE OF ANTERIOR VAGINAL WALL: ICD-10-CM

## 2019-02-26 DIAGNOSIS — Z12.31 ENCOUNTER FOR SCREENING MAMMOGRAM FOR BREAST CANCER: ICD-10-CM

## 2019-02-26 PROBLEM — Z01.419 ENCNTR FOR GYN EXAM (GENERAL) (ROUTINE) W/O ABN FINDINGS: Status: ACTIVE | Noted: 2019-02-26

## 2019-02-26 PROCEDURE — 99396 PREV VISIT EST AGE 40-64: CPT | Performed by: NURSE PRACTITIONER

## 2019-02-26 RX ORDER — MELOXICAM 7.5 MG/1
7.5 TABLET ORAL DAILY
COMMUNITY

## 2019-02-26 RX ORDER — BIOTIN 1 MG
TABLET ORAL
COMMUNITY

## 2019-02-26 NOTE — PROGRESS NOTES
Assessment/Plan:     Calcium 1200-1500mg + 600-1000 IU Vit D daily  Max absorption at once is approx 600 mg of calcium  Pap with HR HPV q 3 years-due 2023  Annual mammogram  Colonoscopy, if indicated  Monthly BSE  Exercise 150 minutes per week minimum  Kegels 20 times twice daily  Silicone based lubricant with sex  Luvena or [place] vaginal moisturizer  Negative depression screen  Diagnoses and all orders for this visit:    Encntr for gyn exam (general) (routine) w abnormal findings    Vaginal atrophy  -     Ambulatory referral to Physical Therapy; Future  -     conjugated estrogens (PREMARIN) vaginal cream; Use 0 5 gm daily x 2 weeks then 0 5 gm twice weekly    Prolapse of anterior vaginal wall  -     Ambulatory referral to Physical Therapy; Future    Encounter for screening mammogram for breast cancer  -     Mammo screening bilateral w 3d & cad; Future    Other orders  -     Cholecalciferol (VITAMIN D3) 1000 units CAPS; Take by mouth  -     LYSINE PO; Take by mouth  -     CALCIUM CITRATE PO; Take by mouth  -     brompheniramine-pseudoephedrine (DIMETAPP) 1-15 MG/5ML ELIX; Take by mouth every 6 (six) hours as needed for allergies  -     meloxicam (MOBIC) 7 5 mg tablet; Take 7 5 mg by mouth daily              Subjective:      Patient ID: Kingston Avilez is a 62 y o  female  Kingston Avilez is a 62 y o  female who is here today for her annual visit  Hx of regular gynecologic care  No hx of abnormal pap  No health concerns  Admits to vaginal dryness and currently uses vaseline for hydration  LMP  approx 10 years ago  No HRT used  No vaginal bleeding  Kingston Avilez is sexually active with male partner/  of 32 years  No TRUE  Walks for exercise 5 days per week  Normal pap with negative HR HPV 3/20/18  Normal mammo 3/18          The following portions of the patient's history were reviewed and updated as appropriate: allergies, current medications, past family history, past medical history, past social history, past surgical history and problem list     Review of Systems   Constitutional: Negative  Negative for activity change, appetite change, chills, diaphoresis, fatigue, fever and unexpected weight change  HENT: Negative for congestion, dental problem, sneezing, sore throat and trouble swallowing  Eyes: Negative for visual disturbance  Respiratory: Negative for chest tightness and shortness of breath  Cardiovascular: Negative for chest pain and leg swelling  Gastrointestinal: Negative for abdominal pain, constipation, diarrhea, nausea and vomiting  Genitourinary: Positive for dyspareunia and vaginal pain (with coitus)  Negative for difficulty urinating, dysuria, frequency, hematuria, pelvic pain, urgency, vaginal bleeding and vaginal discharge  Musculoskeletal: Negative for back pain and neck pain  Skin: Negative  Allergic/Immunologic: Negative  Neurological: Negative for weakness and headaches  Hematological: Negative for adenopathy  Psychiatric/Behavioral: Negative  Objective:      /62 (BP Location: Left arm, Patient Position: Sitting, Cuff Size: Standard)   Pulse 60   Ht 5' 5" (1 651 m)   Wt 77 3 kg (170 lb 6 4 oz)   BMI 28 36 kg/m²          Physical Exam   Constitutional: She is oriented to person, place, and time  She appears well-developed and well-nourished  HENT:   Head: Normocephalic and atraumatic  Eyes: Right eye exhibits no discharge  Left eye exhibits no discharge  Neck: Normal range of motion  Neck supple  Cardiovascular: Normal rate, regular rhythm, normal heart sounds and intact distal pulses  Pulmonary/Chest: Effort normal and breath sounds normal  No breast tenderness, discharge or bleeding  Breasts are symmetrical    Abdominal: Soft  Genitourinary: Vagina normal and uterus normal  Rectal exam shows no external hemorrhoid  No breast tenderness, discharge or bleeding  Pelvic exam was performed with patient supine  No labial fusion   There is no rash, tenderness, lesion or injury on the right labia  There is no rash, tenderness, lesion or injury on the left labia  Cervix exhibits no motion tenderness, no discharge and no friability  Right adnexum displays no mass, no tenderness and no fullness  Left adnexum displays no mass, no tenderness and no fullness  No erythema, tenderness or bleeding in the vagina  No foreign body in the vagina  No signs of injury around the vagina  No vaginal discharge found  Genitourinary Comments: Slight uterine and anterior vaginal wall prolapse   Musculoskeletal: Normal range of motion  Lymphadenopathy:     She has no cervical adenopathy  No inguinal adenopathy noted on the right or left side  Right: No inguinal adenopathy present  Left: No inguinal adenopathy present  Neurological: She is alert and oriented to person, place, and time  Skin: Skin is warm and dry  Psychiatric: She has a normal mood and affect  Nursing note and vitals reviewed

## 2019-02-26 NOTE — PATIENT INSTRUCTIONS
Calcium 1200-1500mg + 600-1000 IU Vit D daily  Max absorption at once is approx 600 mg of calcium  Pap with HR HPV q 3 years-due 2023  Annual mammogram  Colonoscopy, if indicated  Monthly BSE  Exercise 150 minutes per week minimum  Kegels 20 times twice daily  Silicone based lubricant with sex  Luvena or [place] vaginal moisturizer

## 2019-03-20 ENCOUNTER — HOSPITAL ENCOUNTER (OUTPATIENT)
Dept: RADIOLOGY | Age: 59
Discharge: HOME | End: 2019-03-20
Payer: COMMERCIAL

## 2019-03-20 VITALS — HEIGHT: 65 IN | BODY MASS INDEX: 28.32 KG/M2 | WEIGHT: 170 LBS

## 2019-03-20 DIAGNOSIS — Z12.31 ENCOUNTER FOR SCREENING MAMMOGRAM FOR BREAST CANCER: ICD-10-CM

## 2019-03-20 DIAGNOSIS — Z12.31 ENCOUNTER FOR SCREENING MAMMOGRAM FOR MALIGNANT NEOPLASM OF BREAST: ICD-10-CM

## 2019-03-20 PROCEDURE — 77063 BREAST TOMOSYNTHESIS BI: CPT

## 2019-03-20 PROCEDURE — 77067 SCR MAMMO BI INCL CAD: CPT

## 2019-04-01 ENCOUNTER — TELEPHONE (OUTPATIENT)
Dept: GYNECOLOGY | Facility: CLINIC | Age: 59
End: 2019-04-01

## 2019-04-18 ENCOUNTER — EVALUATION (OUTPATIENT)
Dept: PHYSICAL THERAPY | Facility: REHABILITATION | Age: 59
End: 2019-04-18
Payer: COMMERCIAL

## 2019-04-18 DIAGNOSIS — N81.89 PELVIC FLOOR WEAKNESS: Primary | ICD-10-CM

## 2019-04-18 DIAGNOSIS — N81.10 PROLAPSE OF ANTERIOR VAGINAL WALL: ICD-10-CM

## 2019-04-18 DIAGNOSIS — N95.2 VAGINAL ATROPHY: ICD-10-CM

## 2019-04-18 PROCEDURE — G8990 OTHER PT/OT CURRENT STATUS: HCPCS | Performed by: PHYSICAL THERAPIST

## 2019-04-18 PROCEDURE — 97162 PT EVAL MOD COMPLEX 30 MIN: CPT | Performed by: PHYSICAL THERAPIST

## 2019-04-18 PROCEDURE — G8991 OTHER PT/OT GOAL STATUS: HCPCS | Performed by: PHYSICAL THERAPIST

## 2019-04-25 ENCOUNTER — OFFICE VISIT (OUTPATIENT)
Dept: PHYSICAL THERAPY | Facility: REHABILITATION | Age: 59
End: 2019-04-25
Payer: COMMERCIAL

## 2019-04-25 DIAGNOSIS — N81.89 PELVIC FLOOR WEAKNESS: Primary | ICD-10-CM

## 2019-04-25 PROCEDURE — 97112 NEUROMUSCULAR REEDUCATION: CPT | Performed by: PHYSICAL THERAPIST

## 2019-05-10 ENCOUNTER — OFFICE VISIT (OUTPATIENT)
Dept: PHYSICAL THERAPY | Facility: REHABILITATION | Age: 59
End: 2019-05-10
Payer: COMMERCIAL

## 2019-05-10 DIAGNOSIS — N81.89 PELVIC FLOOR WEAKNESS: Primary | ICD-10-CM

## 2019-05-10 PROCEDURE — 97110 THERAPEUTIC EXERCISES: CPT | Performed by: PHYSICAL THERAPIST

## 2019-05-10 PROCEDURE — 97140 MANUAL THERAPY 1/> REGIONS: CPT | Performed by: PHYSICAL THERAPIST

## 2019-05-22 ENCOUNTER — APPOINTMENT (OUTPATIENT)
Dept: PHYSICAL THERAPY | Facility: REHABILITATION | Age: 59
End: 2019-05-22
Payer: COMMERCIAL

## 2019-05-30 ENCOUNTER — OFFICE VISIT (OUTPATIENT)
Dept: PHYSICAL THERAPY | Facility: REHABILITATION | Age: 59
End: 2019-05-30
Payer: COMMERCIAL

## 2019-05-30 DIAGNOSIS — N81.89 PELVIC FLOOR WEAKNESS: Primary | ICD-10-CM

## 2019-05-30 PROCEDURE — 97140 MANUAL THERAPY 1/> REGIONS: CPT | Performed by: PHYSICAL THERAPIST

## 2019-05-30 PROCEDURE — 97530 THERAPEUTIC ACTIVITIES: CPT | Performed by: PHYSICAL THERAPIST

## 2019-05-31 ENCOUNTER — APPOINTMENT (OUTPATIENT)
Dept: PHYSICAL THERAPY | Facility: REHABILITATION | Age: 59
End: 2019-05-31
Payer: COMMERCIAL

## 2019-06-05 ENCOUNTER — APPOINTMENT (OUTPATIENT)
Dept: PHYSICAL THERAPY | Facility: REHABILITATION | Age: 59
End: 2019-06-05
Payer: COMMERCIAL

## 2019-06-11 ENCOUNTER — OFFICE VISIT (OUTPATIENT)
Dept: PHYSICAL THERAPY | Facility: REHABILITATION | Age: 59
End: 2019-06-11
Payer: COMMERCIAL

## 2019-06-11 DIAGNOSIS — N81.89 PELVIC FLOOR WEAKNESS: Primary | ICD-10-CM

## 2019-06-11 PROCEDURE — 97140 MANUAL THERAPY 1/> REGIONS: CPT | Performed by: PHYSICAL THERAPIST

## 2019-06-11 PROCEDURE — 97110 THERAPEUTIC EXERCISES: CPT | Performed by: PHYSICAL THERAPIST

## 2020-03-06 ENCOUNTER — ANNUAL EXAM (OUTPATIENT)
Dept: GYNECOLOGY | Facility: CLINIC | Age: 60
End: 2020-03-06

## 2020-03-06 VITALS
SYSTOLIC BLOOD PRESSURE: 114 MMHG | BODY MASS INDEX: 28.59 KG/M2 | HEIGHT: 65 IN | WEIGHT: 171.6 LBS | DIASTOLIC BLOOD PRESSURE: 60 MMHG | HEART RATE: 44 BPM

## 2020-03-06 DIAGNOSIS — Z12.31 ENCOUNTER FOR SCREENING MAMMOGRAM FOR BREAST CANCER: ICD-10-CM

## 2020-03-06 DIAGNOSIS — Z01.411 ENCNTR FOR GYN EXAM (GENERAL) (ROUTINE) W ABNORMAL FINDINGS: Primary | ICD-10-CM

## 2020-03-06 DIAGNOSIS — N95.2 VAGINAL ATROPHY: ICD-10-CM

## 2020-03-06 PROCEDURE — 3008F BODY MASS INDEX DOCD: CPT | Performed by: NURSE PRACTITIONER

## 2020-03-06 PROCEDURE — 99396 PREV VISIT EST AGE 40-64: CPT | Performed by: NURSE PRACTITIONER

## 2020-03-06 NOTE — PROGRESS NOTES
Assessment/Plan:     Calcium 1200-1500mg + 600-1000 IU Vit D daily  Exercise 150 minutes per week minimum including weight bearing exercises  Pap with high risk HPV Q 5 years-due 2023  Annual mammogram (req given) and monthly breast self exam recommended  Colonoscopy- due next year  Kegels 20 times twice daily  Silicone based lubricant with sex  Vaginal moisturizers twice weekly as needed  Does not need premarin refill at this time  Diagnoses and all orders for this visit:    Encntr for gyn exam (general) (routine) w abnormal findings    Encounter for screening mammogram for breast cancer  -     Mammo diagnostic bilateral w 3d & cad; Future    Vaginal atrophy    BMI 28 0-28 9,adult    Other orders  -     Cancel: conjugated estrogens (PREMARIN) vaginal cream; Use 0 5 gm intravaginally twice weekly              Subjective:      Patient ID: Jono Caldwell is a 61 y o  female  Jono Caldwell is a 61 y o  female who is here today for her annual visit  No health concerns  LMP approx 10 years ago  No HRT used  No vaginal bleeding  She never started her premarin due to concerns over risks  Jono Caldwell is sexually active with male partner/ of 33 years  Denies dyspareuniaNo TRUE  Walks for exercise 5 days per week  Normal pap with negative HR HPV 3/20/18  Normal mammo 3/19  Exercises by walking daily  The following portions of the patient's history were reviewed and updated as appropriate: allergies, current medications, past family history, past medical history, past social history, past surgical history and problem list     Review of Systems   Constitutional: Negative  Negative for activity change, appetite change, chills, diaphoresis, fatigue, fever and unexpected weight change  HENT: Negative for congestion, dental problem, sneezing, sore throat and trouble swallowing  Eyes: Negative for visual disturbance  Respiratory: Negative for chest tightness and shortness of breath  Cardiovascular: Negative for chest pain and leg swelling  Gastrointestinal: Negative for abdominal pain, constipation, diarrhea, nausea and vomiting  Genitourinary: Negative for difficulty urinating, dyspareunia, dysuria, frequency, hematuria, menstrual problem, pelvic pain, urgency, vaginal bleeding, vaginal discharge and vaginal pain  Musculoskeletal: Negative for back pain and neck pain  Skin: Negative  Allergic/Immunologic: Negative  Neurological: Negative for weakness and headaches  Hematological: Negative for adenopathy  Psychiatric/Behavioral: Negative  Objective:      /60 (BP Location: Left arm, Patient Position: Sitting, Cuff Size: Large)   Pulse (!) 44   Ht 5' 5" (1 651 m)   Wt 77 8 kg (171 lb 9 6 oz)   LMP  (LMP Unknown)   BMI 28 56 kg/m²          Physical Exam   Constitutional: She is oriented to person, place, and time  Vital signs are normal  She appears well-developed and well-nourished  HENT:   Head: Normocephalic and atraumatic  Eyes: Right eye exhibits no discharge  Left eye exhibits no discharge  Neck: Trachea normal and normal range of motion  Neck supple  No thyromegaly present  Cardiovascular: Normal rate, regular rhythm, normal heart sounds and intact distal pulses  Pulmonary/Chest: Effort normal and breath sounds normal  Right breast exhibits no inverted nipple, no mass, no nipple discharge, no skin change and no tenderness  Left breast exhibits no inverted nipple, no mass, no nipple discharge, no skin change and no tenderness  No breast tenderness, discharge or bleeding  Breasts are symmetrical    Abdominal: Soft  Normal appearance  Genitourinary: Vagina normal and uterus normal  Rectal exam shows no external hemorrhoid  No breast tenderness, discharge or bleeding  Pelvic exam was performed with patient supine  No labial fusion  There is no rash, tenderness, lesion or injury on the right labia   There is no rash, tenderness, lesion or injury on the left labia  Cervix exhibits no motion tenderness, no discharge and no friability  Right adnexum displays no mass, no tenderness and no fullness  Left adnexum displays no mass, no tenderness and no fullness  No erythema, tenderness or bleeding in the vagina  No foreign body in the vagina  No signs of injury around the vagina  No vaginal discharge found  Genitourinary Comments: Slight vulvovaginal atrophy   Musculoskeletal: Normal range of motion  Lymphadenopathy:        Head (right side): No submental, no submandibular and no tonsillar adenopathy present  Head (left side): No submental, no submandibular and no tonsillar adenopathy present  She has no cervical adenopathy  She has no axillary adenopathy  No inguinal adenopathy noted on the right or left side  Right: No inguinal adenopathy present  Left: No inguinal adenopathy present  Neurological: She is alert and oriented to person, place, and time  Skin: Skin is warm and dry  Psychiatric: She has a normal mood and affect  Nursing note and vitals reviewed

## 2020-03-06 NOTE — PATIENT INSTRUCTIONS
Calcium 1200-1500mg + 600-1000 IU Vit D daily  Exercise 150 minutes per week minimum including weight bearing exercises  Pap with high risk HPV Q 5 years-due 2023  Annual mammogram (req given) and monthly breast self exam recommended  Colonoscopy- due next year  Kegels 20 times twice daily  Silicone based lubricant with sex  Vaginal moisturizers twice weekly as needed

## 2020-03-23 ENCOUNTER — HOSPITAL ENCOUNTER (OUTPATIENT)
Dept: RADIOLOGY | Age: 60
Discharge: HOME | End: 2020-03-23
Payer: COMMERCIAL

## 2020-03-23 VITALS — BODY MASS INDEX: 25.71 KG/M2 | WEIGHT: 160 LBS | HEIGHT: 66 IN

## 2020-03-23 DIAGNOSIS — Z12.31 ENCOUNTER FOR SCREENING MAMMOGRAM FOR MALIGNANT NEOPLASM OF BREAST: ICD-10-CM

## 2020-03-23 PROCEDURE — 77063 BREAST TOMOSYNTHESIS BI: CPT

## 2020-03-23 PROCEDURE — 77067 SCR MAMMO BI INCL CAD: CPT

## 2021-03-07 NOTE — PROGRESS NOTES
Assessment/Plan:    Calcium 1200-1500mg + 600-1000 IU Vit D daily  Exercise 150 minutes per week minimum including weight bearing exercises  Weight loss discussed  Pap with high risk HPV Q 5 years-due 2023  Annual mammogram and monthly breast self exam recommended  Colonoscopy-due   Kegels 20 times twice daily  Silicone based lubricant with sex  Vaginal moisturizers twice weekly as needed  Declines vaginal estrogen at this point  Diagnoses and all orders for this visit:    Encntr for gyn exam (general) (routine) w/o abn findings    BMI 29 0-29 9,adult          Subjective:      Patient ID: Demetria Will is a 61 y o  female  Demetria Will is a 61 y o  female who is here today for her annual visit  No health concerns  LMP > 10 years ago  No vaginal bleeding since  Demetria Will is sexually active with male partner/ of 33 years  Denies dyspareunia  She is not using any vaginal estrogen  No TRUE  Followed with [facility] > 1 year ago  Admits to needing to be more compliant with her kegels  Walks for exercise every day of the week  She walked 35265 steps every day last year  Normal pap with negative HR HPV 3/20/18  Normal mammogram 3/20  Her son is getting  next month  The following portions of the patient's history were reviewed and updated as appropriate: allergies, current medications, past family history, past medical history, past social history, past surgical history and problem list     Review of Systems   Constitutional: Negative  Negative for activity change, appetite change, chills, diaphoresis, fatigue, fever and unexpected weight change  HENT: Negative for congestion, dental problem, sneezing, sore throat and trouble swallowing  Eyes: Negative for visual disturbance  Respiratory: Negative for chest tightness and shortness of breath  Cardiovascular: Negative for chest pain and leg swelling     Gastrointestinal: Negative for abdominal pain, constipation, diarrhea, nausea and vomiting  Genitourinary: Negative for difficulty urinating, dyspareunia, dysuria, frequency, hematuria, pelvic pain, urgency, vaginal bleeding, vaginal discharge and vaginal pain  Musculoskeletal: Negative for back pain and neck pain  Skin: Negative  Allergic/Immunologic: Negative  Neurological: Negative for weakness and headaches  Hematological: Negative for adenopathy  Psychiatric/Behavioral: Negative  Objective:      /56 (BP Location: Left arm, Patient Position: Sitting, Cuff Size: Standard)   Pulse (!) 50   Ht 5' 4" (1 626 m)   Wt 76 7 kg (169 lb)   LMP  (LMP Unknown)   BMI 29 01 kg/m²          Physical Exam  Vitals signs and nursing note reviewed  Constitutional:       Appearance: Normal appearance  She is well-developed  HENT:      Head: Normocephalic and atraumatic  Eyes:      General:         Right eye: No discharge  Left eye: No discharge  Neck:      Musculoskeletal: Normal range of motion and neck supple  Thyroid: No thyromegaly  Trachea: Trachea normal    Cardiovascular:      Rate and Rhythm: Normal rate and regular rhythm  Heart sounds: Normal heart sounds  Pulmonary:      Effort: Pulmonary effort is normal       Breath sounds: Normal breath sounds  Chest:      Breasts: Breasts are symmetrical          Right: No inverted nipple, mass, nipple discharge, skin change or tenderness  Left: No inverted nipple, mass, nipple discharge, skin change or tenderness  Abdominal:      Palpations: Abdomen is soft  Genitourinary:     General: Normal vulva  Exam position: Lithotomy position  Labia:         Right: No rash, tenderness, lesion or injury  Left: No rash, tenderness, lesion or injury  Urethra: No prolapse, urethral pain, urethral swelling or urethral lesion  Vagina: Normal  No signs of injury and foreign body  No vaginal discharge, erythema, tenderness or bleeding        Cervix: No cervical motion tenderness, discharge or friability  Uterus: Normal        Adnexa:         Right: No mass, tenderness or fullness  Left: No mass, tenderness or fullness  Rectum: No external hemorrhoid  Musculoskeletal: Normal range of motion  Lymphadenopathy:      Head:      Right side of head: No submental, submandibular or tonsillar adenopathy  Left side of head: No submental, submandibular or tonsillar adenopathy  Cervical: No cervical adenopathy  Upper Body:      Right upper body: No supraclavicular or axillary adenopathy  Left upper body: No supraclavicular or axillary adenopathy  Lower Body: No right inguinal adenopathy  No left inguinal adenopathy  Skin:     General: Skin is warm and dry  Neurological:      Mental Status: She is alert and oriented to person, place, and time  Psychiatric:         Mood and Affect: Mood normal          Behavior: Behavior normal          Thought Content:  Thought content normal          Judgment: Judgment normal

## 2021-03-08 ENCOUNTER — ANNUAL EXAM (OUTPATIENT)
Dept: OBGYN CLINIC | Facility: CLINIC | Age: 61
End: 2021-03-08
Payer: COMMERCIAL

## 2021-03-08 VITALS
DIASTOLIC BLOOD PRESSURE: 56 MMHG | WEIGHT: 169 LBS | BODY MASS INDEX: 28.85 KG/M2 | HEART RATE: 50 BPM | HEIGHT: 64 IN | SYSTOLIC BLOOD PRESSURE: 110 MMHG

## 2021-03-08 DIAGNOSIS — Z01.419 ENCNTR FOR GYN EXAM (GENERAL) (ROUTINE) W/O ABN FINDINGS: Primary | ICD-10-CM

## 2021-03-08 PROCEDURE — 99396 PREV VISIT EST AGE 40-64: CPT | Performed by: NURSE PRACTITIONER

## 2021-03-08 NOTE — PATIENT INSTRUCTIONS
Calcium 1200-1500mg + 600-1000 IU Vit D daily  Exercise 150 minutes per week minimum including weight bearing exercises  Weight loss discussed  Pap with high risk HPV Q 5 years-due 2023  Annual mammogram and monthly breast self exam recommended  Colonoscopy-due   Kegels 20 times twice daily  Silicone based lubricant with sex  Vaginal moisturizers twice weekly as needed

## 2021-03-24 ENCOUNTER — HOSPITAL ENCOUNTER (OUTPATIENT)
Dept: RADIOLOGY | Age: 61
Discharge: HOME | End: 2021-03-24
Payer: COMMERCIAL

## 2021-03-24 VITALS — BODY MASS INDEX: 26.66 KG/M2 | HEIGHT: 65 IN | WEIGHT: 160 LBS

## 2021-03-24 DIAGNOSIS — Z12.31 ENCOUNTER FOR SCREENING MAMMOGRAM FOR MALIGNANT NEOPLASM OF BREAST: ICD-10-CM

## 2021-03-24 PROCEDURE — 77067 SCR MAMMO BI INCL CAD: CPT

## 2021-03-24 PROCEDURE — 77063 BREAST TOMOSYNTHESIS BI: CPT

## 2021-03-30 ENCOUNTER — TELEPHONE (OUTPATIENT)
Dept: OBGYN CLINIC | Facility: CLINIC | Age: 61
End: 2021-03-30

## 2021-03-30 NOTE — TELEPHONE ENCOUNTER
Called pt  And left a voicemail per her communication consent on file that her js was normal and she should continue BSE's and annual mammo's per her provider

## 2021-03-30 NOTE — TELEPHONE ENCOUNTER
----- Message from Tish Russell, [address] sent at 3/24/2021 12:12 PM EDT -----  Normal mammogram  Continue with monthly BSE and annual mammogram

## 2021-04-21 NOTE — RESULT NOTES
Verified Results  * XR HIP 2+ VIEW RIGHT [id number] 09:56AM Jeremy Moore Order Number: [id number]     Test Name Result Flag Reference   * XR HIP/PELV 2-3 VWS RIGHT (Report)     RIGHT HIP     INDICATION: Right groin pain     COMPARISON: None     VIEWS: AP pelvis and 2 coned down views of the hip; 3 images     FINDINGS:     There is no acute fracture or dislocation  No degenerative changes  No lytic or blastic lesions are seen  Soft tissues are unremarkable  IMPRESSION:     No acute osseous abnormality  Workstation performed: [id number]     Signed by:   Carmen Leiva MD   5/19/16 fever

## 2022-12-02 ENCOUNTER — NEW PATIENT (OUTPATIENT)
Dept: URBAN - METROPOLITAN AREA CLINIC 2 | Facility: CLINIC | Age: 62
End: 2022-12-02

## 2022-12-02 PROCEDURE — 92004 COMPRE OPH EXAM NEW PT 1/>: CPT

## 2022-12-02 PROCEDURE — 92015 DETERMINE REFRACTIVE STATE: CPT

## 2022-12-02 ASSESSMENT — TONOMETRY
OS_IOP_MMHG: 16
OD_IOP_MMHG: 14

## 2022-12-02 ASSESSMENT — VISUAL ACUITY
OS_CC: 20/20-1
OU_CC: J1
OD_CC: 20/20

## 2022-12-02 ASSESSMENT — KERATOMETRY
OS_AXISANGLE2_DEGREES: 71
OD_K1POWER_DIOPTERS: 45.00
OD_AXISANGLE_DEGREES: 019
OS_K2POWER_DIOPTERS: 44.75
OD_AXISANGLE2_DEGREES: 109
OS_K1POWER_DIOPTERS: 44.50
OS_AXISANGLE_DEGREES: 161
OD_K2POWER_DIOPTERS: 45.75

## 2023-12-08 ENCOUNTER — COMPREHENSIVE EXAM (OUTPATIENT)
Dept: URBAN - METROPOLITAN AREA CLINIC 1 | Facility: CLINIC | Age: 63
End: 2023-12-08

## 2023-12-08 DIAGNOSIS — Z01.00: ICD-10-CM

## 2023-12-08 DIAGNOSIS — H52.4: ICD-10-CM

## 2023-12-08 DIAGNOSIS — H52.223: ICD-10-CM

## 2023-12-08 DIAGNOSIS — H52.13: ICD-10-CM

## 2023-12-08 PROCEDURE — 92015 DETERMINE REFRACTIVE STATE: CPT

## 2023-12-08 PROCEDURE — 92014 COMPRE OPH EXAM EST PT 1/>: CPT

## 2023-12-08 ASSESSMENT — VISUAL ACUITY
OD_CC: 20/20
OD_CC: J1+
OS_CC: 20/20
OS_CC: J1+

## 2023-12-08 ASSESSMENT — TONOMETRY
OD_IOP_MMHG: 15
OS_IOP_MMHG: 15

## 2024-12-10 ENCOUNTER — COMPREHENSIVE EXAM (OUTPATIENT)
Age: 64
End: 2024-12-10

## 2024-12-10 DIAGNOSIS — H52.223: ICD-10-CM

## 2024-12-10 DIAGNOSIS — Z01.00: ICD-10-CM

## 2024-12-10 DIAGNOSIS — H52.13: ICD-10-CM

## 2024-12-10 DIAGNOSIS — H52.4: ICD-10-CM

## 2024-12-10 PROCEDURE — 92014 COMPRE OPH EXAM EST PT 1/>: CPT

## 2024-12-10 PROCEDURE — 92015 DETERMINE REFRACTIVE STATE: CPT
